# Patient Record
Sex: FEMALE | NOT HISPANIC OR LATINO | URBAN - METROPOLITAN AREA
[De-identification: names, ages, dates, MRNs, and addresses within clinical notes are randomized per-mention and may not be internally consistent; named-entity substitution may affect disease eponyms.]

---

## 2021-01-28 ENCOUNTER — WEB ENCOUNTER (OUTPATIENT)
Dept: URBAN - METROPOLITAN AREA CLINIC 70 | Facility: CLINIC | Age: 61
End: 2021-01-28

## 2021-01-28 ENCOUNTER — OFFICE VISIT (OUTPATIENT)
Dept: URBAN - METROPOLITAN AREA CLINIC 70 | Facility: CLINIC | Age: 61
End: 2021-01-28
Payer: COMMERCIAL

## 2021-01-28 VITALS
DIASTOLIC BLOOD PRESSURE: 91 MMHG | HEART RATE: 76 BPM | HEIGHT: 67 IN | TEMPERATURE: 97.2 F | BODY MASS INDEX: 29.7 KG/M2 | WEIGHT: 189.2 LBS | SYSTOLIC BLOOD PRESSURE: 158 MMHG

## 2021-01-28 DIAGNOSIS — F45.8 GLOBUS SENSATION: ICD-10-CM

## 2021-01-28 DIAGNOSIS — K21.9 GERD: ICD-10-CM

## 2021-01-28 PROBLEM — 267103008 GLOBUS SENSATION: Status: ACTIVE | Noted: 2021-01-28

## 2021-01-28 PROBLEM — 40739000 DYSPHAGIA: Status: ACTIVE | Noted: 2021-01-28

## 2021-01-28 PROCEDURE — 99203 OFFICE O/P NEW LOW 30 MIN: CPT | Performed by: NURSE PRACTITIONER

## 2021-01-28 PROCEDURE — 3017F COLORECTAL CA SCREEN DOC REV: CPT | Performed by: NURSE PRACTITIONER

## 2021-01-28 PROCEDURE — G8427 DOCREV CUR MEDS BY ELIG CLIN: HCPCS | Performed by: NURSE PRACTITIONER

## 2021-01-28 PROCEDURE — 1036F TOBACCO NON-USER: CPT | Performed by: NURSE PRACTITIONER

## 2021-01-28 PROCEDURE — G8417 CALC BMI ABV UP PARAM F/U: HCPCS | Performed by: NURSE PRACTITIONER

## 2021-01-28 RX ORDER — MELOXICAM 15 MG/1
(PRIOR AUTH#:000001502066) TABLET ORAL
Qty: 90 DELAYED RELEASE TABLET | Status: ACTIVE | COMMUNITY

## 2021-01-28 RX ORDER — FLUTICASONE PROPIONATE 50 UG/1
(PRIOR AUTH#:000006812971) SPRAY, METERED NASAL
Qty: 16 SP | Status: ACTIVE | COMMUNITY

## 2021-01-28 RX ORDER — LISINOPRIL 20 MG/1
1 TABLET TABLET ORAL ONCE A DAY
Status: ACTIVE | COMMUNITY

## 2021-01-28 RX ORDER — ESTRADIOL 1 MG/1
1 TABLET TABLET ORAL ONCE A DAY
Status: ACTIVE | COMMUNITY

## 2021-01-28 RX ORDER — OMEPRAZOLE 20 MG/1
(PRIOR AUTH#:000006827622) CAPSULE, DELAYED RELEASE ORAL
Qty: 30 CAP | Status: ACTIVE | COMMUNITY

## 2021-01-28 RX ORDER — PREDNISONE 10 MG/1
(PRIOR AUTH#:000007428778) TABLET ORAL
Qty: 20 DELAYED RELEASE TABLET | Status: DISCONTINUED | COMMUNITY

## 2021-01-28 RX ORDER — LEVOTHYROXINE SODIUM 125 UG/1
(PRIOR AUTH#:000007425133) TABLET ORAL
Qty: 30 DELAYED RELEASE TABLET | Status: ACTIVE | COMMUNITY

## 2021-01-28 RX ORDER — OMEPRAZOLE 20 MG/1
1 CAPSULE 30 MINUTES BEFORE MORNING MEAL CAPSULE, DELAYED RELEASE ORAL ONCE A DAY
Qty: 90 | Refills: 0

## 2021-01-28 RX ORDER — FLUOXETINE HYDROCHLORIDE 20 MG/1
(PRIOR AUTH#:000007426779) CAPSULE ORAL
Qty: 30 CAP | Status: ACTIVE | COMMUNITY

## 2021-01-28 NOTE — HPI-OTHER HISTORIES
Pt is a 60 yr old female whom presents today regarding dysphagia. Pt states she has sensation in her throat as if something is there.  Also feels hoarse at times.  Denies difficulty with swallowing solids or liquids. reports occasional reflux.  She has seen Dr Zuleta recently for an ENT eval and states he wanted her worked up for GERD, States she had a colonoscopy about 7 yrs ago that was normal. Denies abdominal pain, abnormal wt loss, or family hx of colon cancer. She was started on Omeprazole 20mg po daily per Dr Zuleta about 1 week ago and has started to have some improvement in symptoms.

## 2021-03-04 ENCOUNTER — LAB OUTSIDE AN ENCOUNTER (OUTPATIENT)
Dept: URBAN - METROPOLITAN AREA CLINIC 70 | Facility: CLINIC | Age: 61
End: 2021-03-04

## 2021-03-04 ENCOUNTER — OFFICE VISIT (OUTPATIENT)
Dept: URBAN - METROPOLITAN AREA CLINIC 70 | Facility: CLINIC | Age: 61
End: 2021-03-04
Payer: COMMERCIAL

## 2021-03-04 VITALS
SYSTOLIC BLOOD PRESSURE: 163 MMHG | HEIGHT: 67 IN | HEART RATE: 84 BPM | TEMPERATURE: 95.9 F | BODY MASS INDEX: 29.57 KG/M2 | WEIGHT: 188.4 LBS | DIASTOLIC BLOOD PRESSURE: 96 MMHG

## 2021-03-04 DIAGNOSIS — R09.89 GLOBUS SENSATION: ICD-10-CM

## 2021-03-04 DIAGNOSIS — K21.9 GASTROESOPHAGEAL REFLUX DISEASE WITHOUT ESOPHAGITIS: ICD-10-CM

## 2021-03-04 PROCEDURE — 99203 OFFICE O/P NEW LOW 30 MIN: CPT | Performed by: NURSE PRACTITIONER

## 2021-03-04 RX ORDER — OMEPRAZOLE 40 MG/1
1 CAPSULE 30 MINUTES BEFORE MORNING MEAL CAPSULE, DELAYED RELEASE ORAL ONCE A DAY
Qty: 90 | Refills: 3 | OUTPATIENT

## 2021-03-04 RX ORDER — MELOXICAM 15 MG/1
(PRIOR AUTH#:000001502066) TABLET ORAL
Qty: 90 DELAYED RELEASE TABLET | Status: ACTIVE | COMMUNITY

## 2021-03-04 RX ORDER — FLUOXETINE HYDROCHLORIDE 20 MG/1
(PRIOR AUTH#:000007426779) CAPSULE ORAL
Qty: 30 CAP | Status: ACTIVE | COMMUNITY

## 2021-03-04 RX ORDER — LEVOTHYROXINE SODIUM 125 UG/1
(PRIOR AUTH#:000007425133) TABLET ORAL
Qty: 30 DELAYED RELEASE TABLET | Status: ACTIVE | COMMUNITY

## 2021-03-04 RX ORDER — LISINOPRIL 20 MG/1
1 TABLET TABLET ORAL ONCE A DAY
Status: ACTIVE | COMMUNITY

## 2021-03-04 RX ORDER — OMEPRAZOLE 20 MG/1
1 CAPSULE 30 MINUTES BEFORE MORNING MEAL CAPSULE, DELAYED RELEASE ORAL ONCE A DAY
Qty: 90 | Refills: 0 | Status: ACTIVE | COMMUNITY

## 2021-03-04 RX ORDER — ESTRADIOL 1 MG/1
1 TABLET TABLET ORAL ONCE A DAY
Status: ACTIVE | COMMUNITY

## 2021-03-04 RX ORDER — FLUTICASONE PROPIONATE 50 UG/1
(PRIOR AUTH#:000006812971) SPRAY, METERED NASAL
Qty: 16 SP | Status: ACTIVE | COMMUNITY

## 2021-03-04 NOTE — HPI-OTHER HISTORIES
Pt presents today for a f/u regarding GERD. LOV pt was found to have dysphagia and globus sensation. She declined having the EGD at the time and wished to wait to see if anti-acid medication was effective. She had been on Omeprazole x1 week only at the time of the OV. Today pt reports continued reflux with globus sensation.  Pt has remained on Omeprazole 20mg po daily. Had esophagram at [facility] per ENT.  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  OF NOTE LOV:  Pt is a 60 yr old female whom presents today regarding dysphagia. Pt states she has sensation in her throat as if something is there.  Also feels hoarse at times.  Denies difficulty with swallowing solids or liquids. Reports occasional reflux.  She has seen Dr Zuleta recently for an ENT eval and states he wanted her worked up for GERD, States she had a colonoscopy about 7 yrs ago that was normal. Denies abdominal pain, abnormal wt loss, or family hx of colon cancer. She was started on Omeprazole 20mg po daily per Dr Zuleta about 1 week ago and has started to have some improvement in symptoms.

## 2021-04-20 ENCOUNTER — OFFICE VISIT (OUTPATIENT)
Dept: URBAN - METROPOLITAN AREA SURGERY CENTER 24 | Facility: SURGERY CENTER | Age: 61
End: 2021-04-20

## 2021-04-26 ENCOUNTER — OFFICE VISIT (OUTPATIENT)
Dept: URBAN - METROPOLITAN AREA SURGERY CENTER 24 | Facility: SURGERY CENTER | Age: 61
End: 2021-04-26
Payer: COMMERCIAL

## 2021-04-26 DIAGNOSIS — K21.9 ACID REFLUX: ICD-10-CM

## 2021-04-26 DIAGNOSIS — R13.10 ABNORMAL SWALLOWING: ICD-10-CM

## 2021-04-26 DIAGNOSIS — K31.89 ACQUIRED DEFORMITY OF DUODENUM: ICD-10-CM

## 2021-04-26 DIAGNOSIS — B37.81 CANDIDA ESOPHAGITIS: ICD-10-CM

## 2021-04-26 DIAGNOSIS — F45.8 GLOBUS SENSATION: ICD-10-CM

## 2021-04-26 PROCEDURE — G8907 PT DOC NO EVENTS ON DISCHARG: HCPCS | Performed by: INTERNAL MEDICINE

## 2021-04-26 PROCEDURE — 43239 EGD BIOPSY SINGLE/MULTIPLE: CPT | Performed by: INTERNAL MEDICINE

## 2021-04-26 RX ORDER — MELOXICAM 15 MG/1
(PRIOR AUTH#:000001502066) TABLET ORAL
Qty: 90 DELAYED RELEASE TABLET | Status: ACTIVE | COMMUNITY

## 2021-04-26 RX ORDER — OMEPRAZOLE 40 MG/1
1 CAPSULE 30 MINUTES BEFORE MORNING MEAL CAPSULE, DELAYED RELEASE ORAL ONCE A DAY
Qty: 90 | Refills: 3 | Status: ACTIVE | COMMUNITY

## 2021-04-26 RX ORDER — LEVOTHYROXINE SODIUM 125 UG/1
(PRIOR AUTH#:000007425133) TABLET ORAL
Qty: 30 DELAYED RELEASE TABLET | Status: ACTIVE | COMMUNITY

## 2021-04-26 RX ORDER — FLUOXETINE HYDROCHLORIDE 20 MG/1
(PRIOR AUTH#:000007426779) CAPSULE ORAL
Qty: 30 CAP | Status: ACTIVE | COMMUNITY

## 2021-04-26 RX ORDER — ESTRADIOL 1 MG/1
1 TABLET TABLET ORAL ONCE A DAY
Status: ACTIVE | COMMUNITY

## 2021-04-26 RX ORDER — LISINOPRIL 20 MG/1
1 TABLET TABLET ORAL ONCE A DAY
Status: ACTIVE | COMMUNITY

## 2021-04-26 RX ORDER — FLUTICASONE PROPIONATE 50 UG/1
(PRIOR AUTH#:000006812971) SPRAY, METERED NASAL
Qty: 16 SP | Status: ACTIVE | COMMUNITY

## 2021-05-27 ENCOUNTER — OFFICE VISIT (OUTPATIENT)
Dept: URBAN - METROPOLITAN AREA CLINIC 70 | Facility: CLINIC | Age: 61
End: 2021-05-27
Payer: COMMERCIAL

## 2021-05-27 VITALS
HEART RATE: 93 BPM | TEMPERATURE: 97 F | SYSTOLIC BLOOD PRESSURE: 154 MMHG | WEIGHT: 182.4 LBS | HEIGHT: 67 IN | DIASTOLIC BLOOD PRESSURE: 93 MMHG | BODY MASS INDEX: 28.63 KG/M2

## 2021-05-27 DIAGNOSIS — Z12.11 COLON CANCER SCREENING: ICD-10-CM

## 2021-05-27 DIAGNOSIS — B37.81 ESOPHAGEAL CANDIDIASIS: ICD-10-CM

## 2021-05-27 DIAGNOSIS — K21.9 GERD WITHOUT ESOPHAGITIS: ICD-10-CM

## 2021-05-27 PROBLEM — 266435005: Status: ACTIVE | Noted: 2021-05-27

## 2021-05-27 PROCEDURE — 99203 OFFICE O/P NEW LOW 30 MIN: CPT | Performed by: NURSE PRACTITIONER

## 2021-05-27 RX ORDER — MELOXICAM 15 MG/1
(PRIOR AUTH#:000001502066) TABLET ORAL
Qty: 90 DELAYED RELEASE TABLET | Status: ACTIVE | COMMUNITY

## 2021-05-27 RX ORDER — FLUCONAZOLE 100 MG/1
1 TABLET TABLET ORAL DAILY
Qty: 14 TABLET | Refills: 0 | OUTPATIENT
Start: 2021-05-27 | End: 2021-06-10

## 2021-05-27 RX ORDER — PANTOPRAZOLE SODIUM 40 MG/1
1 TABLET TABLET, DELAYED RELEASE ORAL TWICE DAILY
Qty: 180 | Refills: 3

## 2021-05-27 RX ORDER — PANTOPRAZOLE SODIUM 40 MG/1
1 TABLET TABLET, DELAYED RELEASE ORAL BID
Status: ACTIVE | COMMUNITY

## 2021-05-27 RX ORDER — FLUOXETINE HYDROCHLORIDE 20 MG/1
(PRIOR AUTH#:000007426779) CAPSULE ORAL
Qty: 30 CAP | Status: ACTIVE | COMMUNITY

## 2021-05-27 RX ORDER — LEVOTHYROXINE SODIUM 125 UG/1
(PRIOR AUTH#:000007425133) TABLET ORAL
Qty: 30 DELAYED RELEASE TABLET | Status: ACTIVE | COMMUNITY

## 2021-05-27 RX ORDER — LISINOPRIL 20 MG/1
1 TABLET TABLET ORAL ONCE A DAY
Status: ACTIVE | COMMUNITY

## 2021-05-27 RX ORDER — ESTRADIOL 1 MG/1
1 TABLET TABLET ORAL ONCE A DAY
Status: ACTIVE | COMMUNITY

## 2021-05-27 RX ORDER — FLUTICASONE PROPIONATE 50 UG/1
(PRIOR AUTH#:000006812971) SPRAY, METERED NASAL
Qty: 16 SP | Status: ACTIVE | COMMUNITY

## 2021-05-27 RX ORDER — OMEPRAZOLE 40 MG/1
1 CAPSULE 30 MINUTES BEFORE MORNING MEAL CAPSULE, DELAYED RELEASE ORAL ONCE A DAY
Qty: 90 | Refills: 3 | Status: DISCONTINUED | COMMUNITY

## 2021-05-27 NOTE — HPI-OTHER HISTORIES
Pt presents today for f/u after EGD. EGD on 4/26/21 found esophageal candida and gastritis.  Pathology positive for esophageal candida and neg for HP. Pt to take Diflucan 100 mg po daily x 14 days and Protonix 40 mg po bid. She is due for a screening colonoscopy. Reports she felt better at first after completing Diflucan, but sensation in her throat has returned. Has been taking Protonix bid.   -OF NOTE LOV 3/4/21:  Pt presents today for a f/u regarding GERD. LOV pt was found to have dysphagia and globus sensation. She declined having the EGD at the time and wished to wait to see if anti-acid medication was effective. She had been on Omeprazole x1 week only at the time of the OV. Today pt reports continued reflux with globus sensation.  Pt has remained on Omeprazole 20mg po daily. Had esophagram at [facility] per ENT.  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

## 2021-10-18 ENCOUNTER — TELEPHONE ENCOUNTER (OUTPATIENT)
Dept: URBAN - METROPOLITAN AREA CLINIC 70 | Facility: CLINIC | Age: 61
End: 2021-10-18

## 2021-10-21 ENCOUNTER — OFFICE VISIT (OUTPATIENT)
Dept: URBAN - METROPOLITAN AREA CLINIC 70 | Facility: CLINIC | Age: 61
End: 2021-10-21
Payer: COMMERCIAL

## 2021-10-21 ENCOUNTER — LAB OUTSIDE AN ENCOUNTER (OUTPATIENT)
Dept: URBAN - METROPOLITAN AREA CLINIC 70 | Facility: CLINIC | Age: 61
End: 2021-10-21

## 2021-10-21 DIAGNOSIS — B37.81 ESOPHAGEAL CANDIDIASIS: ICD-10-CM

## 2021-10-21 DIAGNOSIS — Z12.11 COLON CANCER SCREENING: ICD-10-CM

## 2021-10-21 DIAGNOSIS — K21.9 GERD: ICD-10-CM

## 2021-10-21 DIAGNOSIS — R13.19 ESOPHAGEAL DYSPHAGIA: ICD-10-CM

## 2021-10-21 PROCEDURE — 99214 OFFICE O/P EST MOD 30 MIN: CPT | Performed by: NURSE PRACTITIONER

## 2021-10-21 RX ORDER — FLUOXETINE HYDROCHLORIDE 20 MG/1
(PRIOR AUTH#:000007426779) CAPSULE ORAL
Qty: 30 CAP | Status: ACTIVE | COMMUNITY

## 2021-10-21 RX ORDER — LEVOTHYROXINE SODIUM 125 UG/1
(PRIOR AUTH#:000007425133) TABLET ORAL
Qty: 30 DELAYED RELEASE TABLET | Status: ACTIVE | COMMUNITY

## 2021-10-21 RX ORDER — PANTOPRAZOLE SODIUM 40 MG/1
1 TABLET TABLET, DELAYED RELEASE ORAL TWICE DAILY
Qty: 180 | Refills: 3 | Status: ACTIVE | COMMUNITY

## 2021-10-21 RX ORDER — PANTOPRAZOLE SODIUM 40 MG/1
1 TABLET TABLET, DELAYED RELEASE ORAL TWICE DAILY
OUTPATIENT

## 2021-10-21 RX ORDER — ESTRADIOL 1 MG/1
1 TABLET TABLET ORAL ONCE A DAY
Status: ACTIVE | COMMUNITY

## 2021-10-21 RX ORDER — LISINOPRIL 20 MG/1
1 TABLET TABLET ORAL ONCE A DAY
Status: ACTIVE | COMMUNITY

## 2021-10-21 RX ORDER — MELOXICAM 15 MG/1
(PRIOR AUTH#:000001502066) TABLET ORAL
Qty: 90 DELAYED RELEASE TABLET | Status: ACTIVE | COMMUNITY

## 2021-10-21 RX ORDER — FLUTICASONE PROPIONATE 50 UG/1
(PRIOR AUTH#:000006812971) SPRAY, METERED NASAL
Qty: 16 SP | Status: ACTIVE | COMMUNITY

## 2021-10-21 NOTE — HPI-OTHER HISTORIES
OV 3/4/21: Pt presents today for a f/u regarding GERD. LOV pt was found to have dysphagia and globus sensation. She declined having the EGD at the time and wished to wait to see if anti-acid medication was effective. She had been on Omeprazole x1 week only at the time of the OV. Today pt reports continued reflux with globus sensation.  Pt has remained on Omeprazole 20mg po daily. Had esophagram at [facility] per ENT.  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 ------------------------------------------------------------------------------- OV 5/27/21: Pt presents today for f/u after EGD. EGD on 4/26/21 found esophageal candida and gastritis.  Pathology positive for esophageal candida and neg for HP.Pt to take Diflucan 100 mg po daily x 14 days and Protonix 40 mg po bid. She is due for a screening colonoscopy. Reports she felt better at first after completing Diflucan, but sensation in her throat has returned. Has been taking Protonix bid. ------------------------------------------------------------------ Today 10/21/21: Patient presents today with continued dysphagia despite tx with three rounds of Diflucan 100mg x 14 days. She also c/o throat irritation with having to contantly clear her throat. Heartburn resovled on PPI. Tolerating diet. No odynophagia, wt loss, abdominal pain, or N/V. She is not diabetic or on inhaled steroids. Last colonoscopy was several years ago with negative results. No Fhx of colon cancer. Denies LGI symptoms such as constipation, diarrhea, or rectal bleeding.

## 2021-10-22 LAB — HIV SCREEN 4TH GENERATION WRFX: NON REACTIVE

## 2021-12-15 ENCOUNTER — TELEPHONE ENCOUNTER (OUTPATIENT)
Dept: URBAN - METROPOLITAN AREA CLINIC 92 | Facility: CLINIC | Age: 61
End: 2021-12-15

## 2021-12-15 ENCOUNTER — OFFICE VISIT (OUTPATIENT)
Dept: URBAN - METROPOLITAN AREA SURGERY CENTER 24 | Facility: SURGERY CENTER | Age: 61
End: 2021-12-15

## 2021-12-15 RX ORDER — FLUTICASONE PROPIONATE 50 UG/1
(PRIOR AUTH#:000006812971) SPRAY, METERED NASAL
Qty: 16 SP | Status: ACTIVE | COMMUNITY

## 2021-12-15 RX ORDER — MELOXICAM 15 MG/1
(PRIOR AUTH#:000001502066) TABLET ORAL
Qty: 90 DELAYED RELEASE TABLET | Status: ACTIVE | COMMUNITY

## 2021-12-15 RX ORDER — FLUCONAZOLE 100 MG/1
1 TABLET TABLET ORAL
Qty: 10 | Refills: 0 | OUTPATIENT
Start: 2021-12-15 | End: 2021-12-25

## 2021-12-15 RX ORDER — LISINOPRIL 20 MG/1
1 TABLET TABLET ORAL ONCE A DAY
Status: ACTIVE | COMMUNITY

## 2021-12-15 RX ORDER — LEVOTHYROXINE SODIUM 125 UG/1
(PRIOR AUTH#:000007425133) TABLET ORAL
Qty: 30 DELAYED RELEASE TABLET | Status: ACTIVE | COMMUNITY

## 2021-12-15 RX ORDER — PANTOPRAZOLE SODIUM 40 MG/1
1 TABLET TABLET, DELAYED RELEASE ORAL TWICE DAILY
Status: ACTIVE | COMMUNITY

## 2021-12-15 RX ORDER — FLUOXETINE HYDROCHLORIDE 20 MG/1
(PRIOR AUTH#:000007426779) CAPSULE ORAL
Qty: 30 CAP | Status: ACTIVE | COMMUNITY

## 2021-12-15 RX ORDER — ESTRADIOL 1 MG/1
1 TABLET TABLET ORAL ONCE A DAY
Status: ACTIVE | COMMUNITY

## 2022-01-06 ENCOUNTER — TELEPHONE ENCOUNTER (OUTPATIENT)
Dept: URBAN - METROPOLITAN AREA CLINIC 70 | Facility: CLINIC | Age: 62
End: 2022-01-06

## 2022-01-13 ENCOUNTER — TELEPHONE ENCOUNTER (OUTPATIENT)
Dept: URBAN - METROPOLITAN AREA CLINIC 92 | Facility: CLINIC | Age: 62
End: 2022-01-13

## 2022-01-13 ENCOUNTER — LAB OUTSIDE AN ENCOUNTER (OUTPATIENT)
Dept: URBAN - METROPOLITAN AREA CLINIC 92 | Facility: CLINIC | Age: 62
End: 2022-01-13

## 2022-01-13 ENCOUNTER — OFFICE VISIT (OUTPATIENT)
Dept: URBAN - METROPOLITAN AREA SURGERY CENTER 24 | Facility: SURGERY CENTER | Age: 62
End: 2022-01-13
Payer: COMMERCIAL

## 2022-01-13 DIAGNOSIS — R13.10 ABNORMAL DEGLUTITION: ICD-10-CM

## 2022-01-13 DIAGNOSIS — Z12.11 AVERAGE RISK FOR CRC. DUE TO PT'S CO-MORBID STATE WITH END STAGE DEMENTIA, HIGH RISK FOR ANESTHESIA (PER NEUROLOGY); INABILITY TO TAKE A BOWEL PREP....WOULD NOT ADVISE ANY COLORECTAL CANCER SCREENING INCLUDING STOOL TEST FOR FECAL BLOOD.: ICD-10-CM

## 2022-01-13 PROCEDURE — 43235 EGD DIAGNOSTIC BRUSH WASH: CPT | Performed by: INTERNAL MEDICINE

## 2022-01-13 PROCEDURE — G8907 PT DOC NO EVENTS ON DISCHARG: HCPCS | Performed by: INTERNAL MEDICINE

## 2022-01-13 PROCEDURE — G0121 COLON CA SCRN NOT HI RSK IND: HCPCS | Performed by: INTERNAL MEDICINE

## 2022-01-13 RX ORDER — MELOXICAM 15 MG/1
(PRIOR AUTH#:000001502066) TABLET ORAL
Qty: 90 DELAYED RELEASE TABLET | Status: ACTIVE | COMMUNITY

## 2022-01-13 RX ORDER — LEVOTHYROXINE SODIUM 125 UG/1
(PRIOR AUTH#:000007425133) TABLET ORAL
Qty: 30 DELAYED RELEASE TABLET | Status: ACTIVE | COMMUNITY

## 2022-01-13 RX ORDER — ESTRADIOL 1 MG/1
1 TABLET TABLET ORAL ONCE A DAY
Status: ACTIVE | COMMUNITY

## 2022-01-13 RX ORDER — FLUTICASONE PROPIONATE 50 UG/1
(PRIOR AUTH#:000006812971) SPRAY, METERED NASAL
Qty: 16 SP | Status: ACTIVE | COMMUNITY

## 2022-01-13 RX ORDER — PANTOPRAZOLE SODIUM 40 MG/1
1 TABLET TABLET, DELAYED RELEASE ORAL TWICE DAILY
Status: ACTIVE | COMMUNITY

## 2022-01-13 RX ORDER — FLUOXETINE HYDROCHLORIDE 20 MG/1
(PRIOR AUTH#:000007426779) CAPSULE ORAL
Qty: 30 CAP | Status: ACTIVE | COMMUNITY

## 2022-01-13 RX ORDER — LISINOPRIL 20 MG/1
1 TABLET TABLET ORAL ONCE A DAY
Status: ACTIVE | COMMUNITY

## 2022-01-26 ENCOUNTER — WEB ENCOUNTER (OUTPATIENT)
Dept: URBAN - METROPOLITAN AREA CLINIC 70 | Facility: CLINIC | Age: 62
End: 2022-01-26

## 2022-05-12 ENCOUNTER — OFFICE VISIT (OUTPATIENT)
Dept: URBAN - METROPOLITAN AREA CLINIC 70 | Facility: CLINIC | Age: 62
End: 2022-05-12

## 2022-05-13 ENCOUNTER — OFFICE VISIT (OUTPATIENT)
Dept: URBAN - METROPOLITAN AREA CLINIC 70 | Facility: CLINIC | Age: 62
End: 2022-05-13
Payer: COMMERCIAL

## 2022-05-13 ENCOUNTER — DASHBOARD ENCOUNTERS (OUTPATIENT)
Age: 62
End: 2022-05-13

## 2022-05-13 DIAGNOSIS — Z12.11 COLON CANCER SCREENING: ICD-10-CM

## 2022-05-13 DIAGNOSIS — K21.9 GERD: ICD-10-CM

## 2022-05-13 DIAGNOSIS — R09.89 GLOBUS SENSATION: ICD-10-CM

## 2022-05-13 DIAGNOSIS — B37.81 ESOPHAGEAL CANDIDIASIS: ICD-10-CM

## 2022-05-13 PROBLEM — 40890009: Status: ACTIVE | Noted: 2021-10-25

## 2022-05-13 PROCEDURE — 99214 OFFICE O/P EST MOD 30 MIN: CPT | Performed by: NURSE PRACTITIONER

## 2022-05-13 RX ORDER — FLUOXETINE HYDROCHLORIDE 20 MG/1
(PRIOR AUTH#:000007426779) CAPSULE ORAL
Qty: 30 CAP | Status: ACTIVE | COMMUNITY

## 2022-05-13 RX ORDER — LISINOPRIL 20 MG/1
1 TABLET TABLET ORAL ONCE A DAY
Status: ACTIVE | COMMUNITY

## 2022-05-13 RX ORDER — OMEPRAZOLE 40 MG/1
1 CAPSULE 30 MINUTES BEFORE MORNING MEAL CAPSULE, DELAYED RELEASE ORAL TWICE A DAY
Qty: 180 CAPSULE | Refills: 3 | OUTPATIENT
Start: 2022-05-13

## 2022-05-13 RX ORDER — MELOXICAM 15 MG/1
(PRIOR AUTH#:000001502066) TABLET ORAL
Qty: 90 DELAYED RELEASE TABLET | Status: ACTIVE | COMMUNITY

## 2022-05-13 RX ORDER — PANTOPRAZOLE SODIUM 40 MG/1
1 TABLET TABLET, DELAYED RELEASE ORAL TWICE DAILY
Status: ACTIVE | COMMUNITY

## 2022-05-13 RX ORDER — ESTRADIOL 1 MG/1
1 TABLET TABLET ORAL ONCE A DAY
Status: ACTIVE | COMMUNITY

## 2022-05-13 RX ORDER — LEVOTHYROXINE SODIUM 125 UG/1
(PRIOR AUTH#:000007425133) TABLET ORAL
Qty: 30 DELAYED RELEASE TABLET | Status: ACTIVE | COMMUNITY

## 2022-05-13 RX ORDER — FLUTICASONE PROPIONATE 50 UG/1
(PRIOR AUTH#:000006812971) SPRAY, METERED NASAL
Qty: 16 SP | Status: ACTIVE | COMMUNITY

## 2022-05-13 NOTE — HPI-OTHER HISTORIES
OV 3/4/21: Pt presents today for a f/u regarding GERD. LOV pt was found to have dysphagia and globus sensation. She declined having the EGD at the time and wished to wait to see if anti-acid medication was effective. She had been on Omeprazole x1 week only at the time of the OV. Today pt reports continued reflux with globus sensation.  Pt has remained on Omeprazole 20mg po daily. Had esophagram at [facility] per ENT.  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 ------------------------------------------------------------------------------- OV 5/27/21: Pt presents today for f/u after EGD. EGD on 4/26/21 found esophageal candida and gastritis.  Pathology positive for esophageal candida and neg for HP.Pt to take Diflucan 100 mg po daily x 14 days and Protonix 40 mg po bid. She is due for a screening colonoscopy. Reports she felt better at first after completing Diflucan, but sensation in her throat has returned. Has been taking Protonix bid. ------------------------------------------------------------------ OV 10/21/21: Patient presents today with continued dysphagia despite tx with three rounds of Diflucan 100mg x 14 days. She also c/o throat irritation with having to contantly clear her throat. Heartburn resovled on PPI. Tolerating diet. No odynophagia, wt loss, abdominal pain, or N/V. She is not diabetic or on inhaled steroids. Last colonoscopy was several years ago with negative results. No Fhx of colon cancer. Denies LGI symptoms such as constipation, diarrhea, or rectal bleeding. -------------------------------------------------------------------- Today 5/13/22: Patient presents today for follow up. HIV negative 10/21/21. She underwent repeat EGD on 1/13/22 with normal finding with prior candida esophagitis resolved, along with a colonoscopy with also normal findings.. She also had an abdominal CT and chest x-ray on 1/20/22 that was unremarkable. Results discussed with patient with all questions answered. She reports continued chronic sensation that there is something stuck in her throat with swallowing (globus sensation w/o change). No difficulty with swallowing solids or liquids. No pain with swallowing. Currently taking daily PPI with protonix BID with no heartburn or regurgitation but states she would like to change medication back to omeprazole due to cost. She is followed by ENT Dr. 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 Denies abdominal pain, wt loss, N/V, LGI symtoms, or signs of GI bleeding.

## 2022-05-16 PROBLEM — 235595009 GASTROESOPHAGEAL REFLUX DISEASE: Status: ACTIVE | Noted: 2022-05-13

## 2022-05-16 PROBLEM — 305058001: Status: ACTIVE | Noted: 2021-10-25

## 2023-10-20 NOTE — PHYSICAL EXAM NECK/THYROID:
normal appearance , without tenderness upon palpation , no deformities , trachea midline , Thyroid normal size , no thyroid nodules , no masses , no JVD , thyroid nontender , normal appearance , without tenderness upon palpation , no deformities , trachea midline , Thyroid normal size , no thyroid nodules , no masses , no JVD , thyroid nontender HEMATURIA

## 2025-01-03 ENCOUNTER — LAB OUTSIDE AN ENCOUNTER (OUTPATIENT)
Dept: URBAN - METROPOLITAN AREA CLINIC 70 | Facility: CLINIC | Age: 65
End: 2025-01-03

## 2025-01-03 ENCOUNTER — OFFICE VISIT (OUTPATIENT)
Dept: URBAN - METROPOLITAN AREA CLINIC 70 | Facility: CLINIC | Age: 65
End: 2025-01-03
Payer: COMMERCIAL

## 2025-01-03 VITALS
TEMPERATURE: 97.6 F | SYSTOLIC BLOOD PRESSURE: 143 MMHG | BODY MASS INDEX: 27.62 KG/M2 | DIASTOLIC BLOOD PRESSURE: 96 MMHG | WEIGHT: 176 LBS | HEIGHT: 67 IN | OXYGEN SATURATION: 97 % | HEART RATE: 103 BPM

## 2025-01-03 DIAGNOSIS — Z12.11 COLON CANCER SCREENING: ICD-10-CM

## 2025-01-03 DIAGNOSIS — K21.9 GERD: ICD-10-CM

## 2025-01-03 DIAGNOSIS — R09.89 GLOBUS SENSATION: ICD-10-CM

## 2025-01-03 DIAGNOSIS — R10.12 LUQ ABDOMINAL PAIN: ICD-10-CM

## 2025-01-03 DIAGNOSIS — R19.7 DIARRHEA, UNSPECIFIED TYPE: ICD-10-CM

## 2025-01-03 DIAGNOSIS — B37.81 ESOPHAGEAL CANDIDIASIS: ICD-10-CM

## 2025-01-03 DIAGNOSIS — A04.9 BACTERIAL INTESTINAL INFECTION, UNSPECIFIED: ICD-10-CM

## 2025-01-03 PROCEDURE — 99214 OFFICE O/P EST MOD 30 MIN: CPT | Performed by: NURSE PRACTITIONER

## 2025-01-03 RX ORDER — PANTOPRAZOLE SODIUM 40 MG/1
1 TABLET TABLET, DELAYED RELEASE ORAL TWICE DAILY
Status: ON HOLD | COMMUNITY

## 2025-01-03 RX ORDER — ESTRADIOL 1 MG/1
1 TABLET TABLET ORAL ONCE A DAY
Status: ACTIVE | COMMUNITY

## 2025-01-03 RX ORDER — LISINOPRIL 20 MG/1
1 TABLET TABLET ORAL ONCE A DAY
Status: ACTIVE | COMMUNITY

## 2025-01-03 RX ORDER — OMEPRAZOLE 40 MG/1
1 CAPSULE 30 MINUTES BEFORE MORNING MEAL CAPSULE, DELAYED RELEASE ORAL TWICE A DAY
Qty: 180 CAPSULE | Refills: 3 | Status: ACTIVE | COMMUNITY
Start: 2022-05-13

## 2025-01-03 RX ORDER — FLUTICASONE PROPIONATE 50 UG/1
(PRIOR AUTH#:000006812971) SPRAY, METERED NASAL
Qty: 16 SP | Status: ACTIVE | COMMUNITY

## 2025-01-03 RX ORDER — MELOXICAM 15 MG/1
(PRIOR AUTH#:000001502066) TABLET ORAL
Qty: 90 DELAYED RELEASE TABLET | Status: ACTIVE | COMMUNITY

## 2025-01-03 RX ORDER — OMEPRAZOLE 40 MG/1
1 CAPSULE 30 MINUTES BEFORE MORNING MEAL CAPSULE, DELAYED RELEASE ORAL TWICE A DAY
Qty: 180 CAPSULE | Refills: 3 | OUTPATIENT

## 2025-01-03 RX ORDER — FLUOXETINE HYDROCHLORIDE 20 MG/1
(PRIOR AUTH#:000007426779) CAPSULE ORAL
Qty: 30 CAP | Status: ACTIVE | COMMUNITY

## 2025-01-03 RX ORDER — LEVOTHYROXINE SODIUM 125 UG/1
(PRIOR AUTH#:000007425133) TABLET ORAL
Qty: 30 DELAYED RELEASE TABLET | Status: ACTIVE | COMMUNITY

## 2025-01-03 NOTE — HPI-TODAY'S VISIT:
OV 3/4/21: Pt presents today for a f/u regarding GERD. LOV pt was found to have dysphagia and globus sensation. She declined having the EGD at the time and wished to wait to see if anti-acid medication was effective. She had been on Omeprazole x1 week only at the time of the OV. Today pt reports continued reflux with globus sensation. Pt has remained on Omeprazole 20mg po daily. Had esophagram at [facility] per ENT. 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 - - - - - - - - - - - - - - - - - - - - - - - - - - - - - - - - - - - - - - - - OV 5/27/21: Pt presents today for f/u after EGD. EGD on 4/26/21 found esophageal candida and gastritis. Pathology positive for esophageal candida and neg for HP.Pt to take Diflucan 100 mg po daily x 14 days and Protonix 40 mg po bid. She is due for a screening colonoscopy. Reports she felt better at first after completing Diflucan, but sensation in her throat has returned. Has been taking Protonix bid. - - - - - - - - - - - - - - - - - - - - - - - - - - - - - - - - -- OV 10/21/21: Patient presents today with continued dysphagia despite tx with three rounds of Diflucan 100mg x 14 days. She also c/o throat irritation with having to contantly clear her throat. Heartburn resovled on PPI. Tolerating diet. No odynophagia, wt loss, abdominal pain, or N/V. She is not diabetic or on inhaled steroids. Last colonoscopy was several years ago with negative results. No Fhx of colon cancer. Denies LGI symptoms such as constipation, diarrhea, or rectal bleeding. - - - - - - - - - - - - - - - - - - - - - - - - - - - - - - - - - -  OV 5/13/22: Patient presents today for follow up. HIV negative 10/21/21. She underwent repeat EGD on 1/13/22 with normal finding with prior candida esophagitis resolved, along with a colonoscopy with also normal findings.. She also had an abdominal CT and chest x-ray on 1/20/22 that was unremarkable. Results discussed with patient with all questions answered. She reports continued chronic sensation that there is something stuck in her throat with swallowing (globus sensation w/o change). No difficulty with swallowing solids or liquids. No pain with swallowing. Currently taking daily PPI with protonix BID with no heartburn or regurgitation but states she would like to change medication back to omeprazole due to cost. She is followed by ENT Dr. 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 Denies abdominal pain, wt loss, N/V, LGI symtoms, or signs of GI bleeding. - - - - - - - - - - - - - Today 1/3/25: Patient presents today with acute LUQ abdominal and associated diarrhea that began 2 weeks ago. Has some nausea but no vomiting. Compliant with taking daily PPI. Takes meloxicam. Drinks 2 alcohol drinks (rum with coke) nightly for a long time. No recent travel, ill constacts, or new medications. Denies fever, wt loss, jaundice, constipation, or signs of GI bleeding.

## 2025-01-07 LAB
A/G RATIO: 2.1
ALBUMIN: 5.1
ALKALINE PHOSPHATASE: 84
ALT (SGPT): 27
AST (SGOT): 29
BILIRUBIN, TOTAL: 0.6
BUN/CREATININE RATIO: (no result)
BUN: 8
CALCIUM: 10.6
CARBON DIOXIDE, TOTAL: 26
CHLORIDE: 93
CREATININE: 0.98
EGFR: 65
GLOBULIN, TOTAL: 2.4
GLUCOSE: 112
HEMATOCRIT: 42.8
HEMOGLOBIN: 14.8
IMMUNOGLOBULIN A, QN, SERUM: 109
INTERPRETATION: (no result)
LIPASE: 27
MCH: 33.6
MCHC: 34.6
MCV: 97.1
MPV: 10.7
PLATELET COUNT: 346
POTASSIUM: 4.8
PROTEIN, TOTAL: 7.5
RDW: 12
RED BLOOD CELL COUNT: 4.41
SODIUM: 135
T-TRANSGLUTAMINASE (TTG) IGA: <1
T4, FREE: 1.9
TSH W/REFLEX TO FT4: 0.07
WHITE BLOOD CELL COUNT: 7.2

## 2025-01-11 LAB
CAMPYLOBACTER GROUP: NOT DETECTED
CLOSTRIDIUM DIFFICILE: (no result)
NOROVIRUS GI/GII: NOT DETECTED
PANCREATIC ELASTASE, FECAL: >800
ROTAVIRUS A: NOT DETECTED
SALMONELLA SPECIES: NOT DETECTED
SHIGA TOXIN 1: NOT DETECTED
SHIGA TOXIN 2: NOT DETECTED
SHIGELLA SPECIES: NOT DETECTED
VIBRIO GROUP: NOT DETECTED
YERSINIA ENTEROCOLITICA: NOT DETECTED

## 2025-01-17 ENCOUNTER — OFFICE VISIT (OUTPATIENT)
Dept: URBAN - METROPOLITAN AREA CLINIC 70 | Facility: CLINIC | Age: 65
End: 2025-01-17

## 2025-01-21 ENCOUNTER — OFFICE VISIT (OUTPATIENT)
Dept: URBAN - METROPOLITAN AREA CLINIC 70 | Facility: CLINIC | Age: 65
End: 2025-01-21

## 2025-01-24 ENCOUNTER — OFFICE VISIT (OUTPATIENT)
Dept: URBAN - METROPOLITAN AREA CLINIC 70 | Facility: CLINIC | Age: 65
End: 2025-01-24
Payer: COMMERCIAL

## 2025-01-24 VITALS
TEMPERATURE: 97.9 F | HEIGHT: 67 IN | DIASTOLIC BLOOD PRESSURE: 86 MMHG | OXYGEN SATURATION: 96 % | BODY MASS INDEX: 27.94 KG/M2 | WEIGHT: 178 LBS | SYSTOLIC BLOOD PRESSURE: 143 MMHG | HEART RATE: 71 BPM

## 2025-01-24 DIAGNOSIS — R19.7 DIARRHEA, UNSPECIFIED TYPE: ICD-10-CM

## 2025-01-24 DIAGNOSIS — K21.9 GERD: ICD-10-CM

## 2025-01-24 DIAGNOSIS — R09.89 GLOBUS SENSATION: ICD-10-CM

## 2025-01-24 DIAGNOSIS — B37.81 ESOPHAGEAL CANDIDIASIS: ICD-10-CM

## 2025-01-24 DIAGNOSIS — R10.12 LUQ ABDOMINAL PAIN: ICD-10-CM

## 2025-01-24 DIAGNOSIS — Z12.11 COLON CANCER SCREENING: ICD-10-CM

## 2025-01-24 PROCEDURE — 99214 OFFICE O/P EST MOD 30 MIN: CPT | Performed by: NURSE PRACTITIONER

## 2025-01-24 RX ORDER — OMEPRAZOLE 40 MG/1
1 CAPSULE 30 MINUTES BEFORE MORNING MEAL CAPSULE, DELAYED RELEASE ORAL TWICE A DAY
Qty: 180 CAPSULE | Refills: 3 | Status: ACTIVE | COMMUNITY

## 2025-01-24 RX ORDER — LISINOPRIL 20 MG/1
1 TABLET TABLET ORAL ONCE A DAY
Status: ACTIVE | COMMUNITY

## 2025-01-24 RX ORDER — PANTOPRAZOLE SODIUM 40 MG/1
1 TABLET TABLET, DELAYED RELEASE ORAL TWICE DAILY
Status: ON HOLD | COMMUNITY

## 2025-01-24 RX ORDER — FLUOXETINE HYDROCHLORIDE 20 MG/1
(PRIOR AUTH#:000007426779) CAPSULE ORAL
Qty: 30 CAP | Status: ACTIVE | COMMUNITY

## 2025-01-24 RX ORDER — ESTRADIOL 1 MG/1
1 TABLET TABLET ORAL ONCE A DAY
Status: DISCONTINUED | COMMUNITY

## 2025-01-24 RX ORDER — FLUTICASONE PROPIONATE 50 UG/1
(PRIOR AUTH#:000006812971) SPRAY, METERED NASAL
Qty: 16 SP | Status: ACTIVE | COMMUNITY

## 2025-01-24 RX ORDER — MELOXICAM 15 MG/1
(PRIOR AUTH#:000001502066) TABLET ORAL
Qty: 90 DELAYED RELEASE TABLET | Status: ACTIVE | COMMUNITY

## 2025-01-24 RX ORDER — OMEPRAZOLE 40 MG/1
1 CAPSULE 30 MINUTES BEFORE MORNING MEAL CAPSULE, DELAYED RELEASE ORAL TWICE A DAY
Qty: 180 CAPSULE | Refills: 3 | OUTPATIENT

## 2025-01-24 RX ORDER — LEVOTHYROXINE SODIUM 125 UG/1
(PRIOR AUTH#:000007425133) TABLET ORAL
Qty: 30 DELAYED RELEASE TABLET | Status: ACTIVE | COMMUNITY

## 2025-01-24 NOTE — HPI-TODAY'S VISIT:
OV 3/4/21: Pt presents today for a f/u regarding GERD. LOV pt was found to have dysphagia and globus sensation. She declined having the EGD at the time and wished to wait to see if anti-acid medication was effective. She had been on Omeprazole x1 week only at the time of the OV. Today pt reports continued reflux with globus sensation. Pt has remained on Omeprazole 20mg po daily. Had esophagram at [facility] per ENT. 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 - - - - - - - - - - - - - - - - - - - - - - - - - - - - - - - - - - - - - - - - OV 5/27/21: Pt presents today for f/u after EGD. EGD on 4/26/21 found esophageal candida and gastritis. Pathology positive for esophageal candida and neg for HP.Pt to take Diflucan 100 mg po daily x 14 days and Protonix 40 mg po bid. She is due for a screening colonoscopy. Reports she felt better at first after completing Diflucan, but sensation in her throat has returned. Has been taking Protonix bid. - - - - - - - - - - - - - - - - - - - - - - - - - - - - - - - - -- OV 10/21/21: Patient presents today with continued dysphagia despite tx with three rounds of Diflucan 100mg x 14 days. She also c/o throat irritation with having to contantly clear her throat. Heartburn resovled on PPI. Tolerating diet. No odynophagia, wt loss, abdominal pain, or N/V. She is not diabetic or on inhaled steroids. Last colonoscopy was several years ago with negative results. No Fhx of colon cancer. Denies LGI symptoms such as constipation, diarrhea, or rectal bleeding. - - - - - - - - - - - - - - - - - - - - - - - - - - - - - - - - - -  OV 5/13/22: Patient presents today for follow up. HIV negative 10/21/21. She underwent repeat EGD on 1/13/22 with normal finding with prior candida esophagitis resolved, along with a colonoscopy with also normal findings.. She also had an abdominal CT and chest x-ray on 1/20/22 that was unremarkable. Results discussed with patient with all questions answered. She reports continued chronic sensation that there is something stuck in her throat with swallowing (globus sensation w/o change). No difficulty with swallowing solids or liquids. No pain with swallowing. Currently taking daily PPI with protonix BID with no heartburn or regurgitation but states she would like to change medication back to omeprazole due to cost. She is followed by ENT Dr. 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 Denies abdominal pain, wt loss, N/V, LGI symtoms, or signs of GI bleeding. - - - - - - - - - - - - - OV 1/3/25: Patient presents today with acute LUQ abdominal and associated diarrhea that began 2 weeks ago. Has some nausea but no vomiting. Compliant with taking daily PPI. Takes meloxicam. Drinks 2 alcohol drinks (rum with coke) nightly for a long time. No recent travel, ill constacts, or new medications. Denies fever, wt loss, jaundice, constipation, or signs of GI bleeding. - - - - - - - - - - - - Today 1/24/25: Patient presents today for follow up. Labs/stool studies showed no acute process (elastase, lipase, LFTs, WBC, H/H normal and no evidence of infection). Thyroid results were abnormal c/w known disease which is being managed by PCP. Abdominal CT1/6/25 showed no acute GI process (pancreas normal). Results discussed with patient. She reports feeling much better with prior LUQ pain and diarrhea now resolved. No current or new GI complaints.